# Patient Record
Sex: MALE | Race: WHITE | NOT HISPANIC OR LATINO | URBAN - METROPOLITAN AREA
[De-identification: names, ages, dates, MRNs, and addresses within clinical notes are randomized per-mention and may not be internally consistent; named-entity substitution may affect disease eponyms.]

---

## 2021-11-01 ENCOUNTER — APPOINTMENT (OUTPATIENT)
Dept: URBAN - METROPOLITAN AREA CLINIC 285 | Age: 61
Setting detail: DERMATOLOGY
End: 2021-11-01

## 2021-11-01 DIAGNOSIS — L21.8 OTHER SEBORRHEIC DERMATITIS: ICD-10-CM

## 2021-11-01 PROCEDURE — OTHER COUNSELING: OTHER

## 2021-11-01 PROCEDURE — OTHER MIPS QUALITY: OTHER

## 2021-11-01 PROCEDURE — 99203 OFFICE O/P NEW LOW 30 MIN: CPT

## 2021-11-01 PROCEDURE — OTHER PRESCRIPTION: OTHER

## 2021-11-01 PROCEDURE — OTHER TREATMENT REGIMEN: OTHER

## 2021-11-01 RX ORDER — KETOCONAZOLE 20 MG/ML
SHAMPOO, SUSPENSION TOPICAL
Qty: 120 | Refills: 11 | Status: ERX | COMMUNITY
Start: 2021-11-01

## 2021-11-01 RX ORDER — KETOCONAZOLE 20 MG/G
CREAM TOPICAL
Qty: 30 | Refills: 6 | Status: ERX | COMMUNITY
Start: 2021-11-01

## 2021-11-01 NOTE — PROCEDURE: TREATMENT REGIMEN
Detail Level: Generalized
Initiate Treatment: -ketoconazole 2 % shampoo TP\\nSig: Wash scalp TIW when flaring, use once weekly for maintenance. Leave on for 5 minutes before rinsing.\\n-ketoconazole 2 % topical cream TP\\nSig: Apply thin layer 1-2x day to flaky areas around hairline